# Patient Record
Sex: FEMALE | Race: OTHER | NOT HISPANIC OR LATINO | URBAN - METROPOLITAN AREA
[De-identification: names, ages, dates, MRNs, and addresses within clinical notes are randomized per-mention and may not be internally consistent; named-entity substitution may affect disease eponyms.]

---

## 2021-10-07 ENCOUNTER — APPOINTMENT (RX ONLY)
Dept: URBAN - METROPOLITAN AREA CLINIC 37 | Facility: CLINIC | Age: 61
Setting detail: DERMATOLOGY
End: 2021-10-07

## 2021-10-07 DIAGNOSIS — L20.89 OTHER ATOPIC DERMATITIS: ICD-10-CM

## 2021-10-07 PROBLEM — L20.84 INTRINSIC (ALLERGIC) ECZEMA: Status: ACTIVE | Noted: 2021-10-07

## 2021-10-07 PROCEDURE — 99203 OFFICE O/P NEW LOW 30 MIN: CPT

## 2021-10-07 PROCEDURE — ? ADDITIONAL NOTES

## 2021-10-07 PROCEDURE — ? PRESCRIPTION

## 2021-10-07 PROCEDURE — ? COUNSELING

## 2021-10-07 RX ORDER — PIMECROLIMUS 10 MG/G
CREAM TOPICAL
Qty: 60 | Refills: 3 | Status: ERX | COMMUNITY
Start: 2021-10-07

## 2021-10-07 RX ADMIN — PIMECROLIMUS: 10 CREAM TOPICAL at 00:00

## 2021-10-07 ASSESSMENT — LOCATION DETAILED DESCRIPTION DERM: LOCATION DETAILED: RIGHT MEDIAL EYEBROW

## 2021-10-07 ASSESSMENT — LOCATION SIMPLE DESCRIPTION DERM: LOCATION SIMPLE: RIGHT EYEBROW

## 2021-10-07 ASSESSMENT — LOCATION ZONE DERM: LOCATION ZONE: FACE

## 2021-10-07 NOTE — HPI: RASH
How Severe Is Your Rash?: moderate
Is This A New Presentation, Or A Follow-Up?: Rash
Additional History: Patient used Elidel in the past, and stated it helped. Patient is currently only using Eucerin for eyelids.

## 2021-11-15 ENCOUNTER — APPOINTMENT (RX ONLY)
Dept: URBAN - METROPOLITAN AREA CLINIC 37 | Facility: CLINIC | Age: 61
Setting detail: DERMATOLOGY
End: 2021-11-15

## 2021-11-15 DIAGNOSIS — M71 OTHER BURSOPATHIES: ICD-10-CM

## 2021-11-15 PROBLEM — M71.341 OTHER BURSAL CYST, RIGHT HAND: Status: ACTIVE | Noted: 2021-11-15

## 2021-11-15 PROCEDURE — ? ADDITIONAL NOTES

## 2021-11-15 PROCEDURE — ? COUNSELING

## 2021-11-15 PROCEDURE — ? TREATMENT REGIMEN

## 2021-11-15 PROCEDURE — 99213 OFFICE O/P EST LOW 20 MIN: CPT

## 2021-11-15 ASSESSMENT — LOCATION DETAILED DESCRIPTION DERM: LOCATION DETAILED: RIGHT MID DORSAL INDEX FINGER

## 2021-11-15 ASSESSMENT — LOCATION SIMPLE DESCRIPTION DERM: LOCATION SIMPLE: RIGHT INDEX FINGER

## 2021-11-15 ASSESSMENT — LOCATION ZONE DERM: LOCATION ZONE: FINGER

## 2021-11-15 NOTE — HPI: SKIN LESION
How Severe Is Your Skin Lesion?: moderate
Has Your Skin Lesion Been Treated?: not been treated
Is This A New Presentation, Or A Follow-Up?: Skin Lesion
No respiratory distress. No stridor, Lungs sounds clear with good aeration bilaterally.

## 2022-02-07 ENCOUNTER — APPOINTMENT (RX ONLY)
Dept: URBAN - METROPOLITAN AREA CLINIC 37 | Facility: CLINIC | Age: 62
Setting detail: DERMATOLOGY
End: 2022-02-07

## 2022-02-07 DIAGNOSIS — D22 MELANOCYTIC NEVI: ICD-10-CM

## 2022-02-07 DIAGNOSIS — L20.89 OTHER ATOPIC DERMATITIS: ICD-10-CM

## 2022-02-07 PROBLEM — L30.9 DERMATITIS, UNSPECIFIED: Status: ACTIVE | Noted: 2022-02-07

## 2022-02-07 PROBLEM — D22.39 MELANOCYTIC NEVI OF OTHER PARTS OF FACE: Status: ACTIVE | Noted: 2022-02-07

## 2022-02-07 PROCEDURE — 11102 TANGNTL BX SKIN SINGLE LES: CPT

## 2022-02-07 PROCEDURE — ? COUNSELING

## 2022-02-07 PROCEDURE — 99212 OFFICE O/P EST SF 10 MIN: CPT | Mod: 25

## 2022-02-07 PROCEDURE — ? ADDITIONAL NOTES

## 2022-02-07 PROCEDURE — ? BIOPSY BY SHAVE METHOD

## 2022-02-07 ASSESSMENT — LOCATION SIMPLE DESCRIPTION DERM
LOCATION SIMPLE: RIGHT TEMPLE
LOCATION SIMPLE: RIGHT CHEEK

## 2022-02-07 ASSESSMENT — LOCATION ZONE DERM: LOCATION ZONE: FACE

## 2022-02-07 ASSESSMENT — LOCATION DETAILED DESCRIPTION DERM
LOCATION DETAILED: RIGHT INFERIOR CENTRAL MALAR CHEEK
LOCATION DETAILED: RIGHT MID TEMPLE

## 2022-02-07 NOTE — HPI: SKIN LESION
How Severe Is Your Skin Lesion?: moderate
Has Your Skin Lesion Been Treated?: not been treated
Is This A New Presentation, Or A Follow-Up?: Skin Lesion
Additional History: Patient thought it was eczema and was using her Elidel prescription but area was not going away.

## 2022-02-15 ENCOUNTER — RX ONLY (OUTPATIENT)
Age: 62
Setting detail: RX ONLY
End: 2022-02-15

## 2022-02-15 RX ORDER — TRIAMCINOLONE ACETONIDE 1 MG/G
CREAM TOPICAL
Qty: 80 | Refills: 1 | Status: ERX | COMMUNITY
Start: 2022-02-15

## 2022-06-22 ENCOUNTER — APPOINTMENT (RX ONLY)
Dept: URBAN - METROPOLITAN AREA CLINIC 151 | Facility: CLINIC | Age: 62
Setting detail: DERMATOLOGY
End: 2022-06-22

## 2022-06-22 DIAGNOSIS — L20.89 OTHER ATOPIC DERMATITIS: ICD-10-CM

## 2022-06-22 DIAGNOSIS — D22 MELANOCYTIC NEVI: ICD-10-CM

## 2022-06-22 DIAGNOSIS — L72.0 EPIDERMAL CYST: ICD-10-CM

## 2022-06-22 PROBLEM — L20.84 INTRINSIC (ALLERGIC) ECZEMA: Status: ACTIVE | Noted: 2022-06-22

## 2022-06-22 PROBLEM — D23.5 OTHER BENIGN NEOPLASM OF SKIN OF TRUNK: Status: ACTIVE | Noted: 2022-06-22

## 2022-06-22 PROBLEM — D22.5 MELANOCYTIC NEVI OF TRUNK: Status: ACTIVE | Noted: 2022-06-22

## 2022-06-22 PROCEDURE — ? OBSERVATION AND MEASURE

## 2022-06-22 PROCEDURE — 99203 OFFICE O/P NEW LOW 30 MIN: CPT

## 2022-06-22 PROCEDURE — ? DEFER

## 2022-06-22 PROCEDURE — ? COUNSELING

## 2022-06-22 PROCEDURE — ? DIAGNOSIS COMMENT

## 2022-06-22 ASSESSMENT — LOCATION DETAILED DESCRIPTION DERM
LOCATION DETAILED: LEFT LATERAL SUPERIOR EYELID
LOCATION DETAILED: LEFT SUPERIOR UPPER BACK
LOCATION DETAILED: LEFT AXILLARY VAULT
LOCATION DETAILED: LEFT LATERAL SUPERIOR CHEST

## 2022-06-22 ASSESSMENT — LOCATION ZONE DERM
LOCATION ZONE: EYELID
LOCATION ZONE: AXILLAE
LOCATION ZONE: TRUNK

## 2022-06-22 ASSESSMENT — LOCATION SIMPLE DESCRIPTION DERM
LOCATION SIMPLE: LEFT AXILLARY VAULT
LOCATION SIMPLE: LEFT SUPERIOR EYELID
LOCATION SIMPLE: LEFT UPPER BACK
LOCATION SIMPLE: CHEST

## 2022-06-22 NOTE — PROCEDURE: DEFER
Scheduling Instructions (Optional): Tuesdays or Thursdays at 3:30pm with Dr. Heck
Procedure To Be Performed At Next Visit: Excision
Detail Level: Detailed
Instructions (Optional): Patient must arrive at least 15 minutes before appointment.
Introduction Text (Please End With A Colon): The following procedure was deferred:

## 2022-06-22 NOTE — PROCEDURE: MIPS QUALITY
Quality 134: Screening For Clinical Depression And Follow-Up Plan: The patient was screened for depression and the screen was negative and no follow up required
Detail Level: Detailed
Quality 226: Preventive Care And Screening: Tobacco Use: Screening And Cessation Intervention: Patient screened for tobacco use and is an ex/non-smoker
Quality 130: Documentation Of Current Medications In The Medical Record: Current Medications Documented
Quality 110: Preventive Care And Screening: Influenza Immunization: Influenza Immunization previously received during influenza season
Quality 47: Advance Care Plan: Advance care planning not documented, reason not otherwise specified.
Quality 431: Preventive Care And Screening: Unhealthy Alcohol Use - Screening: Patient not identified as an unhealthy alcohol user when screened for unhealthy alcohol use using a systematic screening method

## 2022-06-22 NOTE — PROCEDURE: OBSERVATION
Body Location Override (Optional - Billing Will Still Be Based On Selected Body Map Location If Applicable): left upper back
Detail Level: Detailed
Size Of Lesion: 1.5cm
Size Of Lesion: 4mm
Morphology Per Location (Optional): Skin toned papule
Body Location Override (Optional - Billing Will Still Be Based On Selected Body Map Location If Applicable): left and right axillary vault

## 2022-06-22 NOTE — PROCEDURE: DIAGNOSIS COMMENT
Detail Level: Zone
Render Risk Assessment In Note?: no
Comment: Pt has been applying pimecrolimus cream QD (prescribed by ext physician) and states the cream has been effective. Reassured pt that pimecrolimus cream is safe on the face indefinitely as the topical is non-steroidal. Encouraged pt to call the office if she needs any refills or RTC for further evaluation.

## 2023-03-16 ENCOUNTER — APPOINTMENT (RX ONLY)
Dept: URBAN - METROPOLITAN AREA CLINIC 151 | Facility: CLINIC | Age: 63
Setting detail: DERMATOLOGY
End: 2023-03-16

## 2023-03-16 DIAGNOSIS — L20.89 OTHER ATOPIC DERMATITIS: ICD-10-CM

## 2023-03-16 DIAGNOSIS — D22 MELANOCYTIC NEVI: ICD-10-CM

## 2023-03-16 DIAGNOSIS — B35.1 TINEA UNGUIUM: ICD-10-CM

## 2023-03-16 PROBLEM — D23.5 OTHER BENIGN NEOPLASM OF SKIN OF TRUNK: Status: ACTIVE | Noted: 2023-03-16

## 2023-03-16 PROBLEM — D22.5 MELANOCYTIC NEVI OF TRUNK: Status: ACTIVE | Noted: 2023-03-16

## 2023-03-16 PROBLEM — L20.84 INTRINSIC (ALLERGIC) ECZEMA: Status: ACTIVE | Noted: 2023-03-16

## 2023-03-16 PROCEDURE — 99213 OFFICE O/P EST LOW 20 MIN: CPT

## 2023-03-16 PROCEDURE — ? PRESCRIPTION

## 2023-03-16 PROCEDURE — ? COUNSELING

## 2023-03-16 PROCEDURE — ? OBSERVATION AND MEASURE

## 2023-03-16 PROCEDURE — ? DIAGNOSIS COMMENT

## 2023-03-16 RX ORDER — EFINACONAZOLE 100 MG/ML
SOLUTION TOPICAL QHS
Qty: 8 | Refills: 11 | Status: ERX | COMMUNITY
Start: 2023-03-16

## 2023-03-16 RX ADMIN — EFINACONAZOLE: 100 SOLUTION TOPICAL at 00:00

## 2023-03-16 ASSESSMENT — LOCATION DETAILED DESCRIPTION DERM: LOCATION DETAILED: LEFT LATERAL SUPERIOR CHEST

## 2023-03-16 ASSESSMENT — LOCATION ZONE DERM: LOCATION ZONE: TRUNK

## 2023-03-16 ASSESSMENT — LOCATION SIMPLE DESCRIPTION DERM: LOCATION SIMPLE: CHEST

## 2023-03-16 NOTE — PROCEDURE: DIAGNOSIS COMMENT
Detail Level: Zone
Render Risk Assessment In Note?: no
Comment: Clear! Pt has not needed pimecrolimus cream QD (prescribed by ext physician) and states the cream has been effective when needed previously. Reassured pt that pimecrolimus cream is safe on the face indefinitely as the topical is non-steroidal. Encouraged pt to call the office if she needs any refills or RTC for further evaluation.

## 2023-07-11 ENCOUNTER — RX ONLY (OUTPATIENT)
Age: 63
Setting detail: RX ONLY
End: 2023-07-11

## 2023-07-11 RX ORDER — PIMECROLIMUS 10 MG/G
CREAM TOPICAL
Qty: 60 | Refills: 3 | Status: ERX | COMMUNITY
Start: 2023-07-11

## 2023-08-17 ENCOUNTER — APPOINTMENT (RX ONLY)
Dept: URBAN - METROPOLITAN AREA CLINIC 151 | Facility: CLINIC | Age: 63
Setting detail: DERMATOLOGY
End: 2023-08-17

## 2023-08-17 DIAGNOSIS — L70.0 ACNE VULGARIS: ICD-10-CM

## 2023-08-17 DIAGNOSIS — L0390 CELLULITIS AND ABSCESS OF UNSPECIFIED SITES: ICD-10-CM

## 2023-08-17 DIAGNOSIS — L0391 CELLULITIS AND ABSCESS OF UNSPECIFIED SITES: ICD-10-CM

## 2023-08-17 PROBLEM — L02.212 CUTANEOUS ABSCESS OF BACK [ANY PART, EXCEPT BUTTOCK]: Status: ACTIVE | Noted: 2023-08-17

## 2023-08-17 PROBLEM — D23.39 OTHER BENIGN NEOPLASM OF SKIN OF OTHER PARTS OF FACE: Status: ACTIVE | Noted: 2023-08-17

## 2023-08-17 PROCEDURE — ? COUNSELING

## 2023-08-17 PROCEDURE — ? DIAGNOSIS COMMENT

## 2023-08-17 PROCEDURE — 99213 OFFICE O/P EST LOW 20 MIN: CPT

## 2023-08-17 PROCEDURE — ? PRESCRIPTION MEDICATION MANAGEMENT

## 2023-08-17 PROCEDURE — ? PRESCRIPTION

## 2023-08-17 RX ORDER — CLINDAMYCIN PHOSPHATE 10 MG/G
GEL TOPICAL BID
Qty: 30 | Refills: 0 | Status: ERX | COMMUNITY
Start: 2023-08-17

## 2023-08-17 RX ORDER — MINOCYCLINE HYDROCHLORIDE 100 MG/1
CAPSULE ORAL
Qty: 20 | Refills: 0 | Status: ERX | COMMUNITY
Start: 2023-08-17

## 2023-08-17 RX ADMIN — CLINDAMYCIN PHOSPHATE: 10 GEL TOPICAL at 00:00

## 2023-08-17 RX ADMIN — MINOCYCLINE HYDROCHLORIDE: 100 CAPSULE ORAL at 00:00

## 2023-08-17 ASSESSMENT — LOCATION DETAILED DESCRIPTION DERM: LOCATION DETAILED: LEFT SUPERIOR UPPER BACK

## 2023-08-17 ASSESSMENT — LOCATION SIMPLE DESCRIPTION DERM: LOCATION SIMPLE: LEFT UPPER BACK

## 2023-08-17 ASSESSMENT — LOCATION ZONE DERM: LOCATION ZONE: TRUNK

## 2023-08-17 NOTE — PROCEDURE: COUNSELING
Detail Level: Detailed
Include Pregnancy/Lactation Warning?: No
Bactrim Pregnancy And Lactation Text: This medication is Pregnancy Category D and is known to cause fetal risk.  It is also excreted in breast milk.
Winlevi Pregnancy And Lactation Text: This medication is considered safe during pregnancy and breastfeeding.
Isotretinoin Counseling: Patient should get monthly blood tests, not donate blood, not drive at night if vision affected, not share medication, and not undergo elective surgery for 6 months after tx completed. Side effects reviewed, pt to contact office should one occur.
Birth Control Pills Pregnancy And Lactation Text: This medication should be avoided if pregnant and for the first 30 days post-partum.
High Dose Vitamin A Counseling: Side effects reviewed, pt to contact office should one occur.
Sarecycline Pregnancy And Lactation Text: This medication is Pregnancy Category D and not consider safe during pregnancy. It is also excreted in breast milk.
Benzoyl Peroxide Counseling: Patient counseled that medicine may cause skin irritation and bleach clothing.  In the event of skin irritation, the patient was advised to reduce the amount of the drug applied or use it less frequently.   The patient verbalized understanding of the proper use and possible adverse effects of benzoyl peroxide.  All of the patient's questions and concerns were addressed.
Topical Retinoid counseling:  Patient advised to apply a pea-sized amount only at bedtime and wait 30 minutes after washing their face before applying.  If too drying, patient may add a non-comedogenic moisturizer. The patient verbalized understanding of the proper use and possible adverse effects of retinoids.  All of the patient's questions and concerns were addressed.
Spironolactone Pregnancy And Lactation Text: This medication can cause feminization of the male fetus and should be avoided during pregnancy. The active metabolite is also found in breast milk.
Erythromycin Counseling:  I discussed with the patient the risks of erythromycin including but not limited to GI upset, allergic reaction, drug rash, diarrhea, increase in liver enzymes, and yeast infections.
Azithromycin Pregnancy And Lactation Text: This medication is considered safe during pregnancy and is also secreted in breast milk.
Dapsone Pregnancy And Lactation Text: This medication is Pregnancy Category C and is not considered safe during pregnancy or breast feeding.
Minocycline Counseling: Patient advised regarding possible photosensitivity and discoloration of the teeth, skin, lips, tongue and gums.  Patient instructed to avoid sunlight, if possible.  When exposed to sunlight, patients should wear protective clothing, sunglasses, and sunscreen.  The patient was instructed to call the office immediately if the following severe adverse effects occur:  hearing changes, easy bruising/bleeding, severe headache, or vision changes.  The patient verbalized understanding of the proper use and possible adverse effects of minocycline.  All of the patient's questions and concerns were addressed.
Doxycycline Pregnancy And Lactation Text: This medication is Pregnancy Category D and not consider safe during pregnancy. It is also excreted in breast milk but is considered safe for shorter treatment courses.
Topical Sulfur Applications Counseling: Topical Sulfur Counseling: Patient counseled that this medication may cause skin irritation or allergic reactions.  In the event of skin irritation, the patient was advised to reduce the amount of the drug applied or use it less frequently.   The patient verbalized understanding of the proper use and possible adverse effects of topical sulfur application.  All of the patient's questions and concerns were addressed.
Azelaic Acid Pregnancy And Lactation Text: This medication is considered safe during pregnancy and breast feeding.
Sarecycline Counseling: Patient advised regarding possible photosensitivity and discoloration of the teeth, skin, lips, tongue and gums.  Patient instructed to avoid sunlight, if possible.  When exposed to sunlight, patients should wear protective clothing, sunglasses, and sunscreen.  The patient was instructed to call the office immediately if the following severe adverse effects occur:  hearing changes, easy bruising/bleeding, severe headache, or vision changes.  The patient verbalized understanding of the proper use and possible adverse effects of sarecycline.  All of the patient's questions and concerns were addressed.
Tazorac Counseling:  Patient advised that medication is irritating and drying.  Patient may need to apply sparingly and wash off after an hour before eventually leaving it on overnight.  The patient verbalized understanding of the proper use and possible adverse effects of tazorac.  All of the patient's questions and concerns were addressed.
Aklief Pregnancy And Lactation Text: It is unknown if this medication is safe to use during pregnancy.  It is unknown if this medication is excreted in breast milk.  Breastfeeding women should use the topical cream on the smallest area of the skin for the shortest time needed while breastfeeding.  Do not apply to nipple and areola.
Topical Clindamycin Counseling: Patient counseled that this medication may cause skin irritation or allergic reactions.  In the event of skin irritation, the patient was advised to reduce the amount of the drug applied or use it less frequently.   The patient verbalized understanding of the proper use and possible adverse effects of clindamycin.  All of the patient's questions and concerns were addressed.
Spironolactone Counseling: Patient advised regarding risks of diarrhea, abdominal pain, hyperkalemia, birth defects (for female patients), liver toxicity and renal toxicity. The patient may need blood work to monitor liver and kidney function and potassium levels while on therapy. The patient verbalized understanding of the proper use and possible adverse effects of spironolactone.  All of the patient's questions and concerns were addressed.
Isotretinoin Pregnancy And Lactation Text: This medication is Pregnancy Category X and is considered extremely dangerous during pregnancy. It is unknown if it is excreted in breast milk.
Birth Control Pills Counseling: Birth Control Pill Counseling: I discussed with the patient the potential side effects of OCPs including but not limited to increased risk of stroke, heart attack, thrombophlebitis, deep venous thrombosis, hepatic adenomas, breast changes, GI upset, headaches, and depression.  The patient verbalized understanding of the proper use and possible adverse effects of OCPs. All of the patient's questions and concerns were addressed.
Topical Retinoid Pregnancy And Lactation Text: This medication is Pregnancy Category C. It is unknown if this medication is excreted in breast milk.
Tetracycline Counseling: Patient counseled regarding possible photosensitivity and increased risk for sunburn.  Patient instructed to avoid sunlight, if possible.  When exposed to sunlight, patients should wear protective clothing, sunglasses, and sunscreen.  The patient was instructed to call the office immediately if the following severe adverse effects occur:  hearing changes, easy bruising/bleeding, severe headache, or vision changes.  The patient verbalized understanding of the proper use and possible adverse effects of tetracycline.  All of the patient's questions and concerns were addressed. Patient understands to avoid pregnancy while on therapy due to potential birth defects.
Erythromycin Pregnancy And Lactation Text: This medication is Pregnancy Category B and is considered safe during pregnancy. It is also excreted in breast milk.
Winlevi Counseling:  I discussed with the patient the risks of topical clascoterone including but not limited to erythema, scaling, itching, and stinging. Patient voiced their understanding.
Azithromycin Counseling:  I discussed with the patient the risks of azithromycin including but not limited to GI upset, allergic reaction, drug rash, diarrhea, and yeast infections.
Benzoyl Peroxide Pregnancy And Lactation Text: This medication is Pregnancy Category C. It is unknown if benzoyl peroxide is excreted in breast milk.
Bactrim Counseling:  I discussed with the patient the risks of sulfa antibiotics including but not limited to GI upset, allergic reaction, drug rash, diarrhea, dizziness, photosensitivity, and yeast infections.  Rarely, more serious reactions can occur including but not limited to aplastic anemia, agranulocytosis, methemoglobinemia, blood dyscrasias, liver or kidney failure, lung infiltrates or desquamative/blistering drug rashes.
Doxycycline Counseling:  Patient counseled regarding possible photosensitivity and increased risk for sunburn.  Patient instructed to avoid sunlight, if possible.  When exposed to sunlight, patients should wear protective clothing, sunglasses, and sunscreen.  The patient was instructed to call the office immediately if the following severe adverse effects occur:  hearing changes, easy bruising/bleeding, severe headache, or vision changes.  The patient verbalized understanding of the proper use and possible adverse effects of doxycycline.  All of the patient's questions and concerns were addressed.
Topical Clindamycin Pregnancy And Lactation Text: This medication is Pregnancy Category B and is considered safe during pregnancy. It is unknown if it is excreted in breast milk.
Topical Sulfur Applications Pregnancy And Lactation Text: This medication is Pregnancy Category C and has an unknown safety profile during pregnancy. It is unknown if this topical medication is excreted in breast milk.
Azelaic Acid Counseling: Patient counseled that medicine may cause skin irritation and to avoid applying near the eyes.  In the event of skin irritation, the patient was advised to reduce the amount of the drug applied or use it less frequently.   The patient verbalized understanding of the proper use and possible adverse effects of azelaic acid.  All of the patient's questions and concerns were addressed.
Aklief counseling:  Patient advised to apply a pea-sized amount only at bedtime and wait 30 minutes after washing their face before applying.  If too drying, patient may add a non-comedogenic moisturizer.  The most commonly reported side effects including irritation, redness, scaling, dryness, stinging, burning, itching, and increased risk of sunburn.  The patient verbalized understanding of the proper use and possible adverse effects of retinoids.  All of the patient's questions and concerns were addressed.
High Dose Vitamin A Pregnancy And Lactation Text: High dose vitamin A therapy is contraindicated during pregnancy and breast feeding.
Dapsone Counseling: I discussed with the patient the risks of dapsone including but not limited to hemolytic anemia, agranulocytosis, rashes, methemoglobinemia, kidney failure, peripheral neuropathy, headaches, GI upset, and liver toxicity.  Patients who start dapsone require monitoring including baseline LFTs and weekly CBCs for the first month, then every month thereafter.  The patient verbalized understanding of the proper use and possible adverse effects of dapsone.  All of the patient's questions and concerns were addressed.
Tazorac Pregnancy And Lactation Text: This medication is not safe during pregnancy. It is unknown if this medication is excreted in breast milk.

## 2023-08-17 NOTE — HPI: OTHER
Condition:: Evaluation of Skin Lesions
Please Describe Your Condition:: Pt states that in the past 6 weeks the EIC on the back has increased in size and become painful to the point where it is painful to move her arm. \\n\\nPt has a lesion on the L side of the nose that was previously cut out multiple years ago and resulted as a benign lesion. The lesion has now come back.

## 2023-08-17 NOTE — PROCEDURE: PRESCRIPTION MEDICATION MANAGEMENT
Render In Strict Bullet Format?: No
Detail Level: Zone
Initiate Treatment: minocycline 100 mg capsule BID x10 days
Initiate Treatment: clindamycin 1 % topical gel BID
Samples Given: CeraVe 4% BP Cleanser

## 2023-08-17 NOTE — PROCEDURE: DIAGNOSIS COMMENT
Comment: Reassured pt that this lesion is benign and does not have to be removed.
Render Risk Assessment In Note?: no
Detail Level: Zone

## 2023-11-17 ENCOUNTER — RX ONLY (OUTPATIENT)
Age: 63
Setting detail: RX ONLY
End: 2023-11-17

## 2023-11-17 RX ORDER — CLINDAMYCIN PHOSPHATE 10 MG/G
GEL TOPICAL BID
Qty: 30 | Refills: 2 | Status: ERX

## 2023-12-28 ENCOUNTER — APPOINTMENT (RX ONLY)
Dept: URBAN - METROPOLITAN AREA CLINIC 151 | Facility: CLINIC | Age: 63
Setting detail: DERMATOLOGY
End: 2023-12-28

## 2023-12-28 DIAGNOSIS — L0390 CELLULITIS AND ABSCESS OF UNSPECIFIED SITES: ICD-10-CM | Status: IMPROVED

## 2023-12-28 DIAGNOSIS — L0391 CELLULITIS AND ABSCESS OF UNSPECIFIED SITES: ICD-10-CM | Status: IMPROVED

## 2023-12-28 PROBLEM — L02.212 CUTANEOUS ABSCESS OF BACK [ANY PART, EXCEPT BUTTOCK]: Status: ACTIVE | Noted: 2023-12-28

## 2023-12-28 PROCEDURE — 99213 OFFICE O/P EST LOW 20 MIN: CPT

## 2023-12-28 PROCEDURE — ? PRESCRIPTION MEDICATION MANAGEMENT

## 2023-12-28 PROCEDURE — ? COUNSELING

## 2023-12-28 PROCEDURE — ? DIAGNOSIS COMMENT

## 2023-12-28 PROCEDURE — ? PRESCRIPTION

## 2023-12-28 RX ORDER — CLOBETASOL PROPIONATE 0.5 MG/G
OINTMENT TOPICAL BID
Qty: 45 | Refills: 1 | Status: ERX | COMMUNITY
Start: 2023-12-28

## 2023-12-28 RX ADMIN — CLOBETASOL PROPIONATE: 0.5 OINTMENT TOPICAL at 00:00

## 2023-12-28 ASSESSMENT — LOCATION DETAILED DESCRIPTION DERM: LOCATION DETAILED: LEFT SUPERIOR UPPER BACK

## 2023-12-28 ASSESSMENT — LOCATION ZONE DERM: LOCATION ZONE: TRUNK

## 2023-12-28 ASSESSMENT — LOCATION SIMPLE DESCRIPTION DERM: LOCATION SIMPLE: LEFT UPPER BACK

## 2023-12-28 NOTE — PROCEDURE: DIAGNOSIS COMMENT
Render Risk Assessment In Note?: no
Comment: Has mostly resolved and pt notes it did drain on its own. Do not recommend antibiotics at this time. Plan to initiate clobetasol twice daily for up to two weeks to decrease any remaining inflammation.
Detail Level: Zone

## 2024-05-30 ENCOUNTER — APPOINTMENT (RX ONLY)
Dept: URBAN - METROPOLITAN AREA CLINIC 151 | Facility: CLINIC | Age: 64
Setting detail: DERMATOLOGY
End: 2024-05-30

## 2024-05-30 DIAGNOSIS — D22 MELANOCYTIC NEVI: ICD-10-CM

## 2024-05-30 DIAGNOSIS — L81.4 OTHER MELANIN HYPERPIGMENTATION: ICD-10-CM

## 2024-05-30 DIAGNOSIS — L57.8 OTHER SKIN CHANGES DUE TO CHRONIC EXPOSURE TO NONIONIZING RADIATION: ICD-10-CM

## 2024-05-30 DIAGNOSIS — D18.0 HEMANGIOMA: ICD-10-CM

## 2024-05-30 DIAGNOSIS — L81.5 LEUKODERMA, NOT ELSEWHERE CLASSIFIED: ICD-10-CM

## 2024-05-30 DIAGNOSIS — L82.1 OTHER SEBORRHEIC KERATOSIS: ICD-10-CM

## 2024-05-30 PROBLEM — D23.9 OTHER BENIGN NEOPLASM OF SKIN, UNSPECIFIED: Status: ACTIVE | Noted: 2024-05-30

## 2024-05-30 PROBLEM — D18.01 HEMANGIOMA OF SKIN AND SUBCUTANEOUS TISSUE: Status: ACTIVE | Noted: 2024-05-30

## 2024-05-30 PROBLEM — D22.5 MELANOCYTIC NEVI OF TRUNK: Status: ACTIVE | Noted: 2024-05-30

## 2024-05-30 PROBLEM — D22.61 MELANOCYTIC NEVI OF RIGHT UPPER LIMB, INCLUDING SHOULDER: Status: ACTIVE | Noted: 2024-05-30

## 2024-05-30 PROCEDURE — ? OBSERVATION AND MEASURE

## 2024-05-30 PROCEDURE — 99213 OFFICE O/P EST LOW 20 MIN: CPT

## 2024-05-30 PROCEDURE — ? SUNSCREEN RECOMMENDATIONS

## 2024-05-30 PROCEDURE — ? DIAGNOSIS COMMENT

## 2024-05-30 PROCEDURE — ? COUNSELING

## 2024-05-30 PROCEDURE — ? PATIENT EDUCATION

## 2024-05-30 ASSESSMENT — LOCATION DETAILED DESCRIPTION DERM
LOCATION DETAILED: SUPERIOR THORACIC SPINE
LOCATION DETAILED: INFERIOR THORACIC SPINE
LOCATION DETAILED: SUPERIOR THORACIC SPINE
LOCATION DETAILED: RIGHT ELBOW
LOCATION DETAILED: MID TRAPEZIAL NECK

## 2024-05-30 ASSESSMENT — LOCATION SIMPLE DESCRIPTION DERM
LOCATION SIMPLE: RIGHT ELBOW
LOCATION SIMPLE: UPPER BACK
LOCATION SIMPLE: UPPER BACK
LOCATION SIMPLE: TRAPEZIAL NECK

## 2024-05-30 ASSESSMENT — LOCATION ZONE DERM
LOCATION ZONE: NECK
LOCATION ZONE: TRUNK
LOCATION ZONE: TRUNK
LOCATION ZONE: ARM

## 2024-05-30 NOTE — PROCEDURE: DIAGNOSIS COMMENT
Comment: Recommend annual FBSE or sooner if concerns.
Render Risk Assessment In Note?: no
Detail Level: Simple

## 2025-06-03 ENCOUNTER — APPOINTMENT (OUTPATIENT)
Dept: URBAN - METROPOLITAN AREA CLINIC 151 | Facility: CLINIC | Age: 65
Setting detail: DERMATOLOGY
End: 2025-06-03

## 2025-06-03 DIAGNOSIS — L81.4 OTHER MELANIN HYPERPIGMENTATION: ICD-10-CM

## 2025-06-03 DIAGNOSIS — D18.0 HEMANGIOMA: ICD-10-CM

## 2025-06-03 DIAGNOSIS — M71 OTHER BURSOPATHIES: ICD-10-CM

## 2025-06-03 DIAGNOSIS — D22 MELANOCYTIC NEVI: ICD-10-CM

## 2025-06-03 DIAGNOSIS — L82.0 INFLAMED SEBORRHEIC KERATOSIS: ICD-10-CM

## 2025-06-03 DIAGNOSIS — L82.1 OTHER SEBORRHEIC KERATOSIS: ICD-10-CM

## 2025-06-03 PROBLEM — D22.5 MELANOCYTIC NEVI OF TRUNK: Status: ACTIVE | Noted: 2025-06-03

## 2025-06-03 PROBLEM — D18.01 HEMANGIOMA OF SKIN AND SUBCUTANEOUS TISSUE: Status: ACTIVE | Noted: 2025-06-03

## 2025-06-03 PROBLEM — M71.30 OTHER BURSAL CYST, UNSPECIFIED SITE: Status: ACTIVE | Noted: 2025-06-03

## 2025-06-03 PROCEDURE — ? COUNSELING

## 2025-06-03 PROCEDURE — ? LIQUID NITROGEN

## 2025-06-03 PROCEDURE — 17110 DESTRUCTION B9 LES UP TO 14: CPT

## 2025-06-03 PROCEDURE — 99213 OFFICE O/P EST LOW 20 MIN: CPT | Mod: 25

## 2025-06-03 PROCEDURE — ? DIAGNOSIS COMMENT

## 2025-06-03 ASSESSMENT — LOCATION SIMPLE DESCRIPTION DERM
LOCATION SIMPLE: LEFT UPPER BACK
LOCATION SIMPLE: RIGHT UPPER BACK
LOCATION SIMPLE: RIGHT LOWER BACK
LOCATION SIMPLE: CHEST

## 2025-06-03 ASSESSMENT — LOCATION DETAILED DESCRIPTION DERM
LOCATION DETAILED: RIGHT INFERIOR UPPER BACK
LOCATION DETAILED: UPPER STERNUM
LOCATION DETAILED: LEFT MID-UPPER BACK
LOCATION DETAILED: RIGHT SUPERIOR LATERAL MIDBACK
LOCATION DETAILED: LEFT INFERIOR UPPER BACK

## 2025-06-03 ASSESSMENT — LOCATION ZONE DERM: LOCATION ZONE: TRUNK

## 2025-06-03 NOTE — PROCEDURE: LIQUID NITROGEN
Include Z78.9 (Other Specified Conditions Influencing Health Status) As An Associated Diagnosis?: No
Show Topical Anesthesia Variable?: Yes
Consent: The patient's consent was obtained including but not limited to risks of crusting, scabbing, blistering, scarring, darker or lighter pigmentary change, recurrence, incomplete removal and infection.
Post-Care Instructions: I reviewed with the patient in detail post-care instructions. Patient is to wear sunprotection, and avoid picking at any of the treated lesions. Pt may apply Vaseline to crusted or scabbing areas.
Medical Necessity Clause: This procedure was medically necessary because the lesions that were treated were:
Detail Level: Detailed
Medical Necessity Information: It is in your best interest to select a reason for this procedure from the list below. All of these items fulfill various CMS LCD requirements except the new and changing color options.
Spray Paint Text: The liquid nitrogen was applied to the skin utilizing a spray paint frosting technique.

## 2025-06-03 NOTE — HPI: OTHER
Condition:: FBSE
Please Describe Your Condition:: Pt reports spot of concern on the chest, denies itch or pain.

## 2025-06-03 NOTE — PROCEDURE: DIAGNOSIS COMMENT
Comment: Educated pt on nature and etiology of condition. Discussed lesion can be treated with ILK vs LN2 if bothersome, vs excision with hand surgeon in [place].
Render Risk Assessment In Note?: no
Detail Level: Zone
Comment: Spot of pt concern per HPI. Educated pt on nature and etiology of condition. Treated with LN2 today, advised pt to apply sunscreen and aquaphor until healed.

## 2025-08-26 ENCOUNTER — RX ONLY (RX ONLY)
Age: 65
End: 2025-08-26

## 2025-08-26 RX ORDER — PIMECROLIMUS 10 MG/G
CREAM TOPICAL
Qty: 60 | Refills: 3 | Status: ERX

## 2025-09-02 ENCOUNTER — APPOINTMENT (OUTPATIENT)
Dept: URBAN - METROPOLITAN AREA CLINIC 151 | Facility: CLINIC | Age: 65
Setting detail: DERMATOLOGY
End: 2025-09-02

## 2025-09-02 DIAGNOSIS — M71 OTHER BURSOPATHIES: ICD-10-CM | Status: INADEQUATELY CONTROLLED

## 2025-09-02 DIAGNOSIS — L57.8 OTHER SKIN CHANGES DUE TO CHRONIC EXPOSURE TO NONIONIZING RADIATION: ICD-10-CM

## 2025-09-02 PROBLEM — M71.30 OTHER BURSAL CYST, UNSPECIFIED SITE: Status: ACTIVE | Noted: 2025-09-02

## 2025-09-02 PROCEDURE — ? DIAGNOSIS COMMENT

## 2025-09-02 PROCEDURE — ? COUNSELING

## 2025-09-02 PROCEDURE — ? PHOTO-DOCUMENTATION

## 2025-09-02 PROCEDURE — ? PRESCRIPTION MEDICATION MANAGEMENT

## 2025-09-02 ASSESSMENT — LOCATION DETAILED DESCRIPTION DERM: LOCATION DETAILED: MID TRAPEZIAL NECK

## 2025-09-02 ASSESSMENT — LOCATION SIMPLE DESCRIPTION DERM: LOCATION SIMPLE: TRAPEZIAL NECK

## 2025-09-02 ASSESSMENT — LOCATION ZONE DERM: LOCATION ZONE: NECK

## 2025-09-04 ENCOUNTER — RX ONLY (RX ONLY)
Age: 65
End: 2025-09-04

## 2025-09-04 RX ORDER — KETOCONAZOLE 20 MG/ML
SHAMPOO, SUSPENSION TOPICAL
Qty: 120 | Refills: 11 | Status: ERX | COMMUNITY
Start: 2025-09-04